# Patient Record
Sex: MALE | Race: WHITE | NOT HISPANIC OR LATINO | Employment: FULL TIME | URBAN - METROPOLITAN AREA
[De-identification: names, ages, dates, MRNs, and addresses within clinical notes are randomized per-mention and may not be internally consistent; named-entity substitution may affect disease eponyms.]

---

## 2018-03-31 ENCOUNTER — APPOINTMENT (OUTPATIENT)
Dept: RADIOLOGY | Facility: IMAGING CENTER | Age: 28
End: 2018-03-31
Attending: NURSE PRACTITIONER

## 2018-03-31 ENCOUNTER — OFFICE VISIT (OUTPATIENT)
Dept: URGENT CARE | Facility: PHYSICIAN GROUP | Age: 28
End: 2018-03-31

## 2018-03-31 VITALS
WEIGHT: 182 LBS | BODY MASS INDEX: 24.12 KG/M2 | OXYGEN SATURATION: 96 % | SYSTOLIC BLOOD PRESSURE: 110 MMHG | HEART RATE: 88 BPM | TEMPERATURE: 98.4 F | DIASTOLIC BLOOD PRESSURE: 70 MMHG | HEIGHT: 73 IN | RESPIRATION RATE: 14 BRPM

## 2018-03-31 DIAGNOSIS — R10.13 EPIGASTRIC PAIN: Primary | ICD-10-CM

## 2018-03-31 DIAGNOSIS — R10.13 EPIGASTRIC PAIN: ICD-10-CM

## 2018-03-31 PROCEDURE — 74019 RADEX ABDOMEN 2 VIEWS: CPT | Mod: TC,FY | Performed by: NURSE PRACTITIONER

## 2018-03-31 PROCEDURE — 99203 OFFICE O/P NEW LOW 30 MIN: CPT | Performed by: NURSE PRACTITIONER

## 2018-03-31 ASSESSMENT — ENCOUNTER SYMPTOMS
DIARRHEA: 1
MYALGIAS: 0
VOMITING: 0
CONSTIPATION: 0
FEVER: 1
BLOOD IN STOOL: 0
ABDOMINAL PAIN: 1
CHILLS: 0
NAUSEA: 1
BRUISES/BLEEDS EASILY: 0

## 2018-03-31 NOTE — PROGRESS NOTES
"Subjective:      Maxi Banks is a 27 y.o. male who presents with Abdominal Pain (Upp abdominal pain, Pt states pain is worsened when lying down.)            Medications, Allergies and Prior Medical Hx reviewed and updated in [facility].with patient/family today         Abdominal Pain   This is a new problem. The current episode started in the past 7 days (4 days). The problem occurs constantly. The problem has been unchanged. The pain is located in the RUQ. The pain is at a severity of 9/10. The quality of the pain is aching and sharp. The abdominal pain does not radiate. Associated symptoms include diarrhea, a fever and nausea. Pertinent negatives include no constipation, dysuria, melena, myalgias or vomiting. Nothing aggravates the pain. The pain is relieved by nothing. He has tried nothing for the symptoms. The treatment provided no relief. There is no history of abdominal surgery.       Review of Systems   Constitutional: Positive for fever. Negative for chills and malaise/fatigue.   Cardiovascular: Negative for chest pain.   Gastrointestinal: Positive for abdominal pain, diarrhea and nausea. Negative for blood in stool, constipation, melena and vomiting.   Genitourinary: Negative for dysuria.   Musculoskeletal: Negative for myalgias.   Endo/Heme/Allergies: Does not bruise/bleed easily.          Objective:     /70   Pulse 88   Temp 36.9 °C (98.4 °F)   Resp 14   Ht 1.854 m (6' 1\")   Wt 82.6 kg (182 lb)   SpO2 96%   BMI 24.01 kg/m²      Physical Exam   Constitutional: He appears well-developed and well-nourished. No distress.   HENT:   Head: Normocephalic and atraumatic.   Right Ear: Tympanic membrane and ear canal normal.   Left Ear: Tympanic membrane and ear canal normal.   Nose: Rhinorrhea present.   Mouth/Throat: Uvula is midline and mucous membranes are normal. No trismus in the jaw. No uvula swelling. Posterior oropharyngeal edema and posterior oropharyngeal erythema present. No oropharyngeal exudate. "   Eyes: Conjunctivae are normal. Pupils are equal, round, and reactive to light.   Neck: Normal range of motion. Neck supple.   Cardiovascular: Normal rate, regular rhythm and normal heart sounds.    Pulmonary/Chest: Effort normal and breath sounds normal. No respiratory distress. He has no wheezes.   Abdominal: Soft. Bowel sounds are increased. There is tenderness in the epigastric area. There is guarding. There is no rebound, no CVA tenderness, no tenderness at McBurney's point and negative Flanagan's sign.       Lymphadenopathy:     He has cervical adenopathy.   Neurological: He is alert.   Awake, alert, answering questions appropriately, moving all extremeties   Skin: Skin is warm and dry. Capillary refill takes less than 2 seconds.   Psychiatric: He has a normal mood and affect. His behavior is normal.   Vitals reviewed.              Assessment/Plan:     1. Epigastric pain  hyoscyamine-maalox plus-lidocaine viscous (GI COCKTAIL)    [id number] VIEWS       GI Cocktail given with no relief of sx.     Xray of abd 2 view -  Reviewed film and radiology interpretation:   Moderate amount of stool throughout the colon.    I discussed with the patient is x-ray showed some mild constipation, but this did not explain  his symptoms. I recommended the patient go to the emergency room for further evaluation or I could order an evaluation, but he would need to go to [facility] to have the test done. The patient refused further evaluation. He refused going to the emergency room or having or tests ordered to be completed.. Patient states he will try laxatives. I discussed with the patient's importance of early recheck if his symptoms do not improve or immediate P going to the emergency room for worsening or changing symptoms. Patient verbalized understanding.